# Patient Record
Sex: MALE | Race: WHITE | NOT HISPANIC OR LATINO | URBAN - METROPOLITAN AREA
[De-identification: names, ages, dates, MRNs, and addresses within clinical notes are randomized per-mention and may not be internally consistent; named-entity substitution may affect disease eponyms.]

---

## 2020-03-17 ENCOUNTER — TRANSCRIBE ORDERS (OUTPATIENT)
Dept: PHYSICAL THERAPY | Facility: CLINIC | Age: 50
End: 2020-03-17

## 2020-03-17 ENCOUNTER — EVALUATION (OUTPATIENT)
Dept: PHYSICAL THERAPY | Facility: CLINIC | Age: 50
End: 2020-03-17
Payer: COMMERCIAL

## 2020-03-17 ENCOUNTER — TELEPHONE (OUTPATIENT)
Dept: PHYSICAL THERAPY | Facility: CLINIC | Age: 50
End: 2020-03-17

## 2020-03-17 DIAGNOSIS — Z98.1 S/P FUSION OF THORACIC SPINE: Primary | ICD-10-CM

## 2020-03-17 PROCEDURE — 97162 PT EVAL MOD COMPLEX 30 MIN: CPT

## 2020-03-17 PROCEDURE — 97535 SELF CARE MNGMENT TRAINING: CPT

## 2020-03-17 NOTE — LETTER
2020    Mirna Beth MD  Dept  Of Neurology  [address],[place], [address] [facility] [place] [address]    Patient: Ember Najera   YOB: 1970   Date of Visit: 3/17/2020     Encounter Diagnosis     ICD-10-CM    1  S/P fusion of thoracic spine Z98 1        Dear Dr Meryl Curran: Thank you for your recent referral of Ember Najera  Please review the attached evaluation summary from Barrett's recent visit  Please verify that you agree with the plan of care by signing the attached order  If you have any questions or concerns, please do not hesitate to call  I sincerely appreciate the opportunity to share in the care of one of your patients and hope to have another opportunity to work with you in the near future  Sincerely,    Freda Elaine, PT      Referring Provider:      I certify that I have read the below Plan of Care and certify the need for these services furnished under this plan of treatment while under my care  Mirna Beth MD  Dept  Of Neurology  [address],[facility] [address], [address]: [phone]          PT Evaluation     Today's date: 3/17/2020  Patient name: Ember Najera  : 1970  MRN: [id number]  Referring provider: Greyson Farias MD  Dx:   Encounter Diagnosis     ICD-10-CM    1  S/P fusion of thoracic spine Z98 1                   Assessment  Assessment details: Pt presents s/p T-spine fusion on 19  Reports use of hard brace until approx 3 weeks ago  Reports recent MD follow up with no specific restrictions on function at that time from MD   He reports continued strength deficits, decreased balance, decreased mm flexibility, and altered sensation LLE  Reports limiting activity to this point  PT notes decreased LLE strength, decreased BLE mm flexibility, decreased balance, decreased trunk ROM  Pt will benefit from PT tx to decrease symptoms and restore normal functional level      Impairments: abnormal gait, abnormal or restricted ROM, activity intolerance, impaired balance, impaired physical strength and lacks appropriate home exercise program    Goals  ST  Decrease pain 50% 6 wk  2  Increase trunk ROM to minimal limitation  6 wk  3  Increase LLE strength to 5/5 6 wk  4  Pt will perform U stance LLE 6-7 seconds 6 wk  LT  Pt will report no pain 12 wk  2  Increase trunk ROM to WNL 12 wk  3  Increase trunk/BLE strength to WNL 12 wk  4  Pt will report no limitations with ADL's 12 wk  5  Pt will report no limitations with ambulation 12 wk    Plan  Patient would benefit from: PT eval and skilled physical therapy  Planned modality interventions: cryotherapy, thermotherapy: hydrocollator packs and unattended electrical stimulation  Planned therapy interventions: abdominal trunk stabilization, manual therapy, balance, strengthening, stretching, therapeutic activities, therapeutic exercise, neuromuscular re-education, flexibility, functional ROM exercises and home exercise program  Frequency: 3x week  Duration in weeks: 12  Treatment plan discussed with: patient        Subjective Evaluation    History of Present Illness  Date of surgery: 2019  Mechanism of injury: Pt presents s/p Thoracic spine fusion on 19  Pt had hard brace which he discontinued use of approx 3 weeks ago  Reports no specific restrictions from MD at this time  Reports loss of sensation LLE and RLE prior to surgery with LLE more effected  Reports now decreased strength and decreased balance  Continued altered sensation LLE (lower leg/foot) with minimal changes noted right  Reports pain lower T-spine/L-spine region  Reports prolonged sitting will increase numbness LLE with minimal symptoms RLE  No sleep disruptions noted  Restricted trunk ROM noted  Pain  Current pain ratin  At best pain ratin  At worst pain ratin  Location: Lower T-spine/L-spine      Quality: tight and discomfort  Aggravating factors: sitting    Life stress: Works in [employer]     Treatments  Previous treatment: massage and chiropractic  Current treatment: massage  Patient Goals  Patient goals for therapy: increased strength, decreased pain, improved balance, increased motion, independence with ADLs/IADLs and return to sport/leisure activities          Objective     Postural Observations  Seated posture: good  Standing posture: good        Tenderness     Additional Tenderness Details  No tenderness to palpation noted T or L spine regions    Good scar mobility noted      Neurological Testing     Additional Neurological Details  Decreased sensation to light touch left tibial and ankle/foot regions    Active Range of Motion   Cervical/Thoracic Spine       Thoracic    Flexion:  Restriction level: minimal  Extension:  Restriction level: moderate  Left lateral flexion:  Restriction level: minimal  Right lateral flexion:  Restriction level: minimal  Left rotation:  Restriction level: moderate  Right rotation:  Restriction level: moderate    Lumbar   Flexion:  Restriction level: minimal  Extension:  Restriction level: minimal  Left lateral flexion:  Restriction level: minimal  Right lateral flexion:  Restriction level: minimal  Left rotation:  Restriction level: moderate  Right rotation:  Restriction level: moderate    Additional Active Range of Motion Details  No pain noted with ROM  Reports tightness with all motions     Strength/Myotome Testing     Left Hip   Planes of Motion   Flexion: 4+  Extension: 4+  Abduction: 4+  Adduction: 4+    Right Hip   Planes of Motion   Flexion: 5  Extension: 5  Abduction: 5  Adduction: 5    Left Knee   Flexion: 4+  Extension: 4+    Right Knee   Flexion: 5  Extension: 5    Left Ankle/Foot   Dorsiflexion: 4+  Plantar flexion: 4+  Inversion: 5  Eversion: 5    Right Ankle/Foot   Dorsiflexion: 5  Plantar flexion: 5  Inversion: 5  Eversion: 5    Additional Strength Details  Minimal strength deficit noted LLE  Pt reports LLE does feel weaker with activity     Tests     Lumbar     Left   Negative passive SLR  Right   Negative passive SLR  Additional Tests Details  Decreased MM flexibility noted all major mm groups BLE    Ambulation     Observational Gait   Gait: within functional limits     Additional Observational Gait Details  Reports decreased balance  Feels unsteady at times    No difficulty noted clearing JOANNA's    Functional Assessment        Comments  Standing Feet Together:  EO  No difficulty,  EC Postural sway  U Stance:  Right 10 sec   Left 2-3 sec  Tandem stance:  Postural sway with more difficulty with right foot forward  Neuro Exam:     Sensation   Light touch LE: left impaired  Light touch LE: right WNL                Precautions:  T-spine Fusion       Manual  3-17-20                                                   Exercise Diary  3-17-20       [address] [facility]        UBE        LTP/MTP        Mini squat        TR/HR        t-band T        bridges        abd crunch        Seated trunk flexion        PPU        LTR        U stance        Heel toe walking        [employer]        [employer]                                        HEP Instructed and issued HEP           Modalities 3-17-20       MHP/CP

## 2020-03-17 NOTE — PROGRESS NOTES
PT Evaluation     Today's date: 3/17/2020  Patient name: Wanda Ríos  : 1970  MRN: [id number]  Referring provider: Detra Schirmer, MD  Dx:   Encounter Diagnosis     ICD-10-CM    1  S/P fusion of thoracic spine Z98 1                   Assessment  Assessment details: Pt presents s/p T-spine fusion on 19  Reports use of hard brace until approx 3 weeks ago  Reports recent MD follow up with no specific restrictions on function at that time from MD   He reports continued strength deficits, decreased balance, decreased mm flexibility, and altered sensation LLE  Reports limiting activity to this point  PT notes decreased LLE strength, decreased BLE mm flexibility, decreased balance, decreased trunk ROM  Pt will benefit from PT tx to decrease symptoms and restore normal functional level  Impairments: abnormal gait, abnormal or restricted ROM, activity intolerance, impaired balance, impaired physical strength and lacks appropriate home exercise program    Goals  ST  Decrease pain 50% 6 wk  2  Increase trunk ROM to minimal limitation  6 wk  3  Increase LLE strength to 5/5 6 wk  4  Pt will perform U stance LLE 6-7 seconds 6 wk  LT  Pt will report no pain 12 wk  2  Increase trunk ROM to WNL 12 wk  3  Increase trunk/BLE strength to WNL 12 wk  4  Pt will report no limitations with ADL's 12 wk  5    Pt will report no limitations with ambulation 12 wk    Plan  Patient would benefit from: PT eval and skilled physical therapy  Planned modality interventions: cryotherapy, thermotherapy: hydrocollator packs and unattended electrical stimulation  Planned therapy interventions: abdominal trunk stabilization, manual therapy, balance, strengthening, stretching, therapeutic activities, therapeutic exercise, neuromuscular re-education, flexibility, functional ROM exercises and home exercise program  Frequency: 3x week  Duration in weeks: 12  Treatment plan discussed with: patient        Subjective Evaluation    History of Present Illness  Date of surgery: 2019  Mechanism of injury: Pt presents s/p Thoracic spine fusion on 19  Pt had hard brace which he discontinued use of approx 3 weeks ago  Reports no specific restrictions from MD at this time  Reports loss of sensation LLE and RLE prior to surgery with LLE more effected  Reports now decreased strength and decreased balance  Continued altered sensation LLE (lower leg/foot) with minimal changes noted right  Reports pain lower T-spine/L-spine region  Reports prolonged sitting will increase numbness LLE with minimal symptoms RLE  No sleep disruptions noted  Restricted trunk ROM noted  Pain  Current pain ratin  At best pain ratin  At worst pain ratin  Location: Lower T-spine/L-spine  Quality: tight and discomfort  Aggravating factors: sitting    Life stress: Works in [id number]     Treatments  Previous treatment: massage and chiropractic  Current treatment: massage  Patient Goals  Patient goals for therapy: increased strength, decreased pain, improved balance, increased motion, independence with ADLs/IADLs and return to sport/leisure activities          Objective     Postural Observations  Seated posture: good  Standing posture: good        Tenderness     Additional Tenderness Details  No tenderness to palpation noted T or L spine regions    Good scar mobility noted      Neurological Testing     Additional Neurological Details  Decreased sensation to light touch left tibial and ankle/foot regions    Active Range of Motion   Cervical/Thoracic Spine       Thoracic    Flexion:  Restriction level: minimal  Extension:  Restriction level: moderate  Left lateral flexion:  Restriction level: minimal  Right lateral flexion:  Restriction level: minimal  Left rotation:  Restriction level: moderate  Right rotation:  Restriction level: moderate    Lumbar   Flexion:  Restriction level: minimal  Extension:  Restriction level: minimal  Left lateral flexion:  Restriction level: minimal  Right lateral flexion:  Restriction level: minimal  Left rotation:  Restriction level: moderate  Right rotation:  Restriction level: moderate    Additional Active Range of Motion Details  No pain noted with ROM  Reports tightness with all motions     Strength/Myotome Testing     Left Hip   Planes of Motion   Flexion: 4+  Extension: 4+  Abduction: 4+  Adduction: 4+    Right Hip   Planes of Motion   Flexion: 5  Extension: 5  Abduction: 5  Adduction: 5    Left Knee   Flexion: 4+  Extension: 4+    Right Knee   Flexion: 5  Extension: 5    Left Ankle/Foot   Dorsiflexion: 4+  Plantar flexion: 4+  Inversion: 5  Eversion: 5    Right Ankle/Foot   Dorsiflexion: 5  Plantar flexion: 5  Inversion: 5  Eversion: 5    Additional Strength Details  Minimal strength deficit noted LLE  Pt reports LLE does feel weaker with activity     Tests     Lumbar     Left   Negative passive SLR  Right   Negative passive SLR  Additional Tests Details  Decreased MM flexibility noted all major mm groups BLE    Ambulation     Observational Gait   Gait: within functional limits     Additional Observational Gait Details  Reports decreased balance  Feels unsteady at times    No difficulty noted clearing LE's    Functional Assessment        Comments  Standing Feet Together:  EO  No difficulty,  EC Postural sway  U Stance:  Right 10 sec   Left 2-3 sec  Tandem stance:  Postural sway with more difficulty with right foot forward  Neuro Exam:     Sensation   Light touch LE: left impaired  Light touch LE: right WNL                Precautions:  T-spine Fusion       Manual  3-17-20                                                   Exercise Diary  3-17-20       [address] [facility]        UBE        LTP/MTP        Mini squat        TR/HR        t-band T        bridges        abd crunch        Seated trunk flexion        PPU        LTR        U stance        Heel toe walking        [email or web address]        [employer] HEP Instructed and issued HEP           Modalities 3-17-20       MHP/CP

## 2020-03-17 NOTE — TELEPHONE ENCOUNTER
PT contacted MD office regarding restrictions/precautions for patient at this point  PT spoke with [place] nurse at MD office  She reports no restrictions at this time but to gradually progress patient  Lifting 20-30# initially with gradual progress  Advised no inversion table at this time as patient questioned use of this

## 2020-03-19 ENCOUNTER — APPOINTMENT (OUTPATIENT)
Dept: PHYSICAL THERAPY | Facility: CLINIC | Age: 50
End: 2020-03-19
Payer: COMMERCIAL

## 2020-03-20 ENCOUNTER — OFFICE VISIT (OUTPATIENT)
Dept: PHYSICAL THERAPY | Facility: CLINIC | Age: 50
End: 2020-03-20
Payer: COMMERCIAL

## 2020-03-20 DIAGNOSIS — Z98.1 S/P FUSION OF THORACIC SPINE: Primary | ICD-10-CM

## 2020-03-20 PROCEDURE — 97110 THERAPEUTIC EXERCISES: CPT

## 2020-03-20 PROCEDURE — 97112 NEUROMUSCULAR REEDUCATION: CPT

## 2020-03-20 NOTE — PROGRESS NOTES
Daily Note     Today's date: 3/20/2020  Patient name: Mary Burt  : 1970  MRN: [id number]  Referring provider: Jonathan Garcia MD  Dx:   Encounter Diagnosis     ICD-10-CM    1  S/P fusion of thoracic spine Z98 1                   Subjective:  I'm doing good  The legs just keep getting numb on and off      Objective: See treatment diary below      Assessment: Tolerated treatment well  No symptoms noted in back/surgical region with TE  Intermittent numbness reported BLE  Updated HEP  Patient would benefit from continued PT      Plan: Continue per plan of care            Precautions:  T-spine Fusion       Manual  3-17-20 3-20-20                                                  Exercise Diary  3-17-20 3-20-20      [address] [facility]  L3  10'      UBE  10'  alt      LTP/MTP  Green 20x      Mini squat  20x      TR/HR  20x      t-band T  Green 20x      bridges  20x      abd crunch  20x      Seated trunk flexion  10x  5"      PPU        LTR        U stance  5x kranthi to loida      Heel toe walking  2x 20 feet      karaoke  2x 20 feet      [employer]                                        HEP Instructed and issued HEP Updated HEP          Modalities 3-17-20 3-20-20      MHP/CP

## 2020-03-27 ENCOUNTER — APPOINTMENT (OUTPATIENT)
Dept: PHYSICAL THERAPY | Facility: CLINIC | Age: 50
End: 2020-03-27
Payer: COMMERCIAL

## 2020-05-14 NOTE — PROGRESS NOTES
PT DISCHARGE     Today's date: 2020  Patient name: Priyanka Santoro  : 1970  MRN: [id number]  Referring provider: Yara Bustos MD  Dx:   Encounter Diagnosis     ICD-10-CM    1  S/P fusion of thoracic spine Z98 1        Start Time: 715  Stop Time: 805  Total time in clinic (min): 50 minutes    Assessment  Assessment details: Pt presented s/p T-spine fusion on 19  Reported use of hard brace until approx 3 weeks prior to PT  Reported recent MD follow up with no specific restrictions on function at that time from MD prior to evaluation  He reported continued strength deficits, decreased balance, decreased mm flexibility, and altered sensation LLE  Reported limiting activity to this point  PT notes decreased LLE strength, decreased BLE mm flexibility, decreased balance, decreased trunk ROM  Pt attended 2 total PT sessions  HEP was instructed and issued  Pt last attended session was on 3-20-20  He did not return after that time  D/C PT services  Impairments: abnormal gait, abnormal or restricted ROM, activity intolerance, impaired balance, impaired physical strength and lacks appropriate home exercise program    Goals  ST  Decrease pain 50% 6 wk  2  Increase trunk ROM to minimal limitation  6 wk  3  Increase LLE strength to 5/5 6 wk  4  Pt will perform U stance LLE 6-7 seconds 6 wk  LT  Pt will report no pain 12 wk  2  Increase trunk ROM to WNL 12 wk  3  Increase trunk/BLE strength to WNL 12 wk  4  Pt will report no limitations with ADL's 12 wk  5    Pt will report no limitations with ambulation 12 wk    Plan  Plan details: D/C PT services  Findings per initial evaluation   Patient would benefit from: PT eval and skilled physical therapy  Planned modality interventions: cryotherapy, thermotherapy: hydrocollator packs and unattended electrical stimulation  Planned therapy interventions: abdominal trunk stabilization, manual therapy, balance, strengthening, stretching, therapeutic activities, therapeutic exercise, neuromuscular re-education, flexibility, functional ROM exercises and home exercise program        Subjective Evaluation    History of Present Illness  Date of surgery: 2019  Mechanism of injury: Pt presents s/p Thoracic spine fusion on 19  Pt had hard brace which he discontinued use of approx 3 weeks ago  Reports no specific restrictions from MD at this time  Reports loss of sensation LLE and RLE prior to surgery with LLE more effected  Reports now decreased strength and decreased balance  Continued altered sensation LLE (lower leg/foot) with minimal changes noted right  Reports pain lower T-spine/L-spine region  Reports prolonged sitting will increase numbness LLE with minimal symptoms RLE  No sleep disruptions noted  Restricted trunk ROM noted  Pain  Current pain ratin  At best pain ratin  At worst pain ratin  Location: Lower T-spine/L-spine  Quality: tight and discomfort  Aggravating factors: sitting    Life stress: Works in [employer]     Treatments  Previous treatment: massage and chiropractic  Current treatment: massage  Patient Goals  Patient goals for therapy: increased strength, decreased pain, improved balance, increased motion, independence with ADLs/IADLs and return to sport/leisure activities          Objective     Postural Observations  Seated posture: good  Standing posture: good        Tenderness     Additional Tenderness Details  No tenderness to palpation noted T or L spine regions    Good scar mobility noted      Neurological Testing     Additional Neurological Details  Decreased sensation to light touch left tibial and ankle/foot regions    Active Range of Motion   Cervical/Thoracic Spine       Thoracic    Flexion:  Restriction level: minimal  Extension:  Restriction level: moderate  Left lateral flexion:  Restriction level: minimal  Right lateral flexion:  Restriction level: minimal  Left rotation:  Restriction level: moderate  Right rotation:  Restriction level: moderate    Lumbar   Flexion:  Restriction level: minimal  Extension:  Restriction level: minimal  Left lateral flexion:  Restriction level: minimal  Right lateral flexion:  Restriction level: minimal  Left rotation:  Restriction level: moderate  Right rotation:  Restriction level: moderate    Additional Active Range of Motion Details  No pain noted with ROM  Reports tightness with all motions     Strength/Myotome Testing     Left Hip   Planes of Motion   Flexion: 4+  Extension: 4+  Abduction: 4+  Adduction: 4+    Right Hip   Planes of Motion   Flexion: 5  Extension: 5  Abduction: 5  Adduction: 5    Left Knee   Flexion: 4+  Extension: 4+    Right Knee   Flexion: 5  Extension: 5    Left Ankle/Foot   Dorsiflexion: 4+  Plantar flexion: 4+  Inversion: 5  Eversion: 5    Right Ankle/Foot   Dorsiflexion: 5  Plantar flexion: 5  Inversion: 5  Eversion: 5    Additional Strength Details  Minimal strength deficit noted LLE  Pt reports LLE does feel weaker with activity     Tests     Lumbar     Left   Negative passive SLR  Right   Negative passive SLR  Additional Tests Details  Decreased MM flexibility noted all major mm groups BLE    Ambulation     Observational Gait   Gait: within functional limits     Additional Observational Gait Details  Reports decreased balance  Feels unsteady at times    No difficulty noted clearing LE's    Functional Assessment        Comments  Standing Feet Together:  EO  No difficulty,  EC Postural sway  U Stance:  Right 10 sec   Left 2-3 sec  Tandem stance:  Postural sway with more difficulty with right foot forward  Neuro Exam:     Sensation   Light touch LE: left impaired  Light touch LE: right WNL

## 2021-04-03 ENCOUNTER — IMMUNIZATIONS (OUTPATIENT)
Dept: FAMILY MEDICINE CLINIC | Facility: HOSPITAL | Age: 51
End: 2021-04-03

## 2021-04-03 DIAGNOSIS — Z23 ENCOUNTER FOR IMMUNIZATION: Primary | ICD-10-CM

## 2021-04-03 PROCEDURE — 91300 SARS-COV-2 / COVID-19 MRNA VACCINE (PFIZER-BIONTECH) 30 MCG: CPT

## 2021-04-03 PROCEDURE — 0001A SARS-COV-2 / COVID-19 MRNA VACCINE (PFIZER-BIONTECH) 30 MCG: CPT

## 2021-04-26 ENCOUNTER — IMMUNIZATIONS (OUTPATIENT)
Dept: FAMILY MEDICINE CLINIC | Facility: HOSPITAL | Age: 51
End: 2021-04-26

## 2021-04-26 DIAGNOSIS — Z23 ENCOUNTER FOR IMMUNIZATION: Primary | ICD-10-CM

## 2021-04-26 PROCEDURE — 0002A SARS-COV-2 / COVID-19 MRNA VACCINE (PFIZER-BIONTECH) 30 MCG: CPT

## 2021-04-26 PROCEDURE — 91300 SARS-COV-2 / COVID-19 MRNA VACCINE (PFIZER-BIONTECH) 30 MCG: CPT
